# Patient Record
Sex: FEMALE | Race: WHITE | NOT HISPANIC OR LATINO | URBAN - METROPOLITAN AREA
[De-identification: names, ages, dates, MRNs, and addresses within clinical notes are randomized per-mention and may not be internally consistent; named-entity substitution may affect disease eponyms.]

---

## 2017-06-13 ENCOUNTER — APPOINTMENT (RX ONLY)
Dept: URBAN - METROPOLITAN AREA CLINIC 24 | Facility: CLINIC | Age: 42
Setting detail: DERMATOLOGY
End: 2017-06-13

## 2017-06-13 DIAGNOSIS — D485 NEOPLASM OF UNCERTAIN BEHAVIOR OF SKIN: ICD-10-CM

## 2017-06-13 DIAGNOSIS — L57.8 OTHER SKIN CHANGES DUE TO CHRONIC EXPOSURE TO NONIONIZING RADIATION: ICD-10-CM

## 2017-06-13 PROBLEM — L20.84 INTRINSIC (ALLERGIC) ECZEMA: Status: ACTIVE | Noted: 2017-06-13

## 2017-06-13 PROBLEM — H91.90 UNSPECIFIED HEARING LOSS, UNSPECIFIED EAR: Status: ACTIVE | Noted: 2017-06-13

## 2017-06-13 PROBLEM — L70.0 ACNE VULGARIS: Status: ACTIVE | Noted: 2017-06-13

## 2017-06-13 PROBLEM — D23.62 OTHER BENIGN NEOPLASM OF SKIN OF LEFT UPPER LIMB, INCLUDING SHOULDER: Status: ACTIVE | Noted: 2017-06-13

## 2017-06-13 PROBLEM — D48.5 NEOPLASM OF UNCERTAIN BEHAVIOR OF SKIN: Status: ACTIVE | Noted: 2017-06-13

## 2017-06-13 PROBLEM — J30.1 ALLERGIC RHINITIS DUE TO POLLEN: Status: ACTIVE | Noted: 2017-06-13

## 2017-06-13 PROCEDURE — ? COUNSELING

## 2017-06-13 PROCEDURE — ? BIOPSY BY SHAVE METHOD

## 2017-06-13 PROCEDURE — 99202 OFFICE O/P NEW SF 15 MIN: CPT | Mod: 25

## 2017-06-13 PROCEDURE — 11100: CPT

## 2017-06-13 PROCEDURE — 11101: CPT

## 2017-06-13 ASSESSMENT — LOCATION DETAILED DESCRIPTION DERM
LOCATION DETAILED: RIGHT SUPERIOR LATERAL UPPER BACK
LOCATION DETAILED: RIGHT MEDIAL SUPERIOR CHEST
LOCATION DETAILED: LEFT INFERIOR CENTRAL MALAR CHEEK
LOCATION DETAILED: RIGHT SUPERIOR UPPER BACK
LOCATION DETAILED: RIGHT CENTRAL MALAR CHEEK

## 2017-06-13 ASSESSMENT — LOCATION SIMPLE DESCRIPTION DERM
LOCATION SIMPLE: RIGHT UPPER BACK
LOCATION SIMPLE: RIGHT CHEEK
LOCATION SIMPLE: RIGHT BACK
LOCATION SIMPLE: CHEST
LOCATION SIMPLE: LEFT CHEEK

## 2017-06-13 ASSESSMENT — LOCATION ZONE DERM
LOCATION ZONE: TRUNK
LOCATION ZONE: FACE

## 2017-06-13 NOTE — PROCEDURE: BIOPSY BY SHAVE METHOD
Biopsy Type: H and E
Bill 31213 For Specimen Handling/Conveyance To Laboratory?: no
Type Of Destruction Used: Curettage
Path Notes (To The Dermatopathologist): Zyclara if superficial
Consent: Written consent was obtained and risks were reviewed including but not limited to scarring, infection, bleeding, scabbing, incomplete removal, and allergy to anesthesia.
Dressing: bandage
Hemostasis: Aluminum Chloride
Additional Anesthesia Volume In Cc (Will Not Render If 0): 0
Biopsy Method: Dermablade
Post-care instructions were reviewed in detail and written instructions are provided. Patient is to keep the biopsy site dry overnight, and then apply bacitracin twice daily until healed. Patient may apply hydrogen peroxide soaks to remove any crusting.
Anesthesia Type: 1% lidocaine with epinephrine
Wound Care: Vaseline
Cryotherapy Text: The wound bed was treated with cryotherapy after the biopsy was performed.
Anesthesia Volume In Cc: 0.3
Electrodesiccation And Curettage Text: The wound bed was treated with electrodesiccation and curettage after the biopsy was performed.
Silver Nitrate Text: The wound bed was treated with silver nitrate after the biopsy was performed.
Accession #: [id number]
Electrodesiccation Text: The wound bed was treated with electrodesiccation after the biopsy was performed.
Detail Level: Detailed
Notification Instructions: Patient will be notified of biopsy results. However, patient instructed to call the office if not contacted within 2 weeks.
Billing Type: Third-Party Bill

## 2021-05-17 NOTE — H&P
Gynecology Major Surgery History and Physical    Jaclynn Canavan  [id number]    Subjective:      Chief complaint:  abnormal uterine bleeding    Laurence Riggins is a 42 y.o.  female with a history of abnormal uterine bleeding. Previous treatment measures include hormone therapy. Ultrasound findings include fibroids and findings consistent with adenomyosis. She is admitted for Procedure(s) (LRB):  HYSTERECTOMY TOTAL LAPAROSCOPIC WITH BILATERAL SALPINGECTOMY (N/A)  ALTIS MID URETHRAL SLING (N/A)    The current method of family planning is vasectomy. Past Medical History:   Diagnosis Date    Cancer ([address]     basal cell    Headache      Past Surgical History:   Procedure Laterality Date    HX HEENT      Sinus     OB History        2    Para   2    Term                AB        Living           SAB        TAB        Ectopic        Molar        Multiple        Live Births                    No Known Allergies    Cannot display prior to admission medications because the patient has not been admitted in this contact.        Family History   Problem Relation Age of Onset    Heart Disease Father     Cancer Maternal Aunt         Breast     Social History     Socioeconomic History    Marital status:      Spouse name: Not on file    Number of children: Not on file    Years of education: Not on file    Highest education level: Not on file   Occupational History    Not on file   Social Needs    Financial resource strain: Not on file    Food insecurity     Worry: Not on file     Inability: Not on file    Transportation needs     Medical: Not on file     Non-medical: Not on file   Tobacco Use    Smoking status: Never Smoker    Smokeless tobacco: Never Used   Substance and Sexual Activity    Alcohol use: No     Frequency: Never    Drug use: No    Sexual activity: Yes     Partners: Male     Birth control/protection: None   Lifestyle    Physical activity     Days per week: Not on file Minutes per session: Not on file    Stress: Not on file   Relationships    Social connections     Talks on phone: Not on file     Gets together: Not on file     Attends [employer] service: Not on file     Active member of club or organization: Not on file     Attends meetings of clubs or organizations: Not on file     Relationship status: Not on file    Intimate partner violence     Fear of current or ex partner: Not on file     Emotionally abused: Not on file     Physically abused: Not on file     Forced sexual activity: Not on file   Other Topics Concern    Not on file   Social History Narrative    Not on file         Review of Systems:  Pertinent items are noted in HPI. Objective: There were no vitals filed for this visit. Physical Exam:  General:  alert, cooperative, no distress, appears stated age   Lungs:  clear to auscultation bilaterally   Heart:  regular rate and rhythm, S1, S2 normal, no murmur, click, rub or gallop   Abdomen: soft, non-tender. Bowel sounds normal. No masses,  no organomegaly. Assessment:     abnormal uterine bleeding, fibroids with fibroids       Plan:     1. Procedure(s) (LRB):  HYSTERECTOMY TOTAL LAPAROSCOPIC WITH BILATERAL SALPINGECTOMY (N/A)  ALTIS MID URETHRAL SLING (N/A)  Discussed the risk of surgery including the risks of bleeding, infection, DVT, and surgical injuries to internal organs including but not limited to the bowels, bladder, rectum, and female reproductive organs. The patient understands the risks, any and all questions were answered to the patient's satisfaction.

## 2021-05-24 VITALS — BODY MASS INDEX: 27.47 KG/M2 | WEIGHT: 175 LBS | HEIGHT: 67 IN

## 2021-05-24 NOTE — PERIOP NOTES
Patient verified name and . Order for consent is found in EHR and matches case posting; patient verifies procedure. Type 2 surgery, Phone assessment complete. Orders were received. Labs per surgeon: none  Labs per anesthesia protocol: hgb - pt to go to out pt lab prior to surgery    Patient COVID test date 21  8645. The testing center is located at the . [address] [facility]. If appointment is needed-patient provided telephone number of [phone]. Patient answered medical/surgical history questions at their best of ability. All prior to admission medications documented in [facility]. Patient instructed to take the following medications the day of surgery according to anesthesia guidelines with a small sip of water:none. Hold all vitamins 7 days prior to surgery and NSAIDS 5 days prior to surgery. Prescription meds to hold:none    Patient instructed on the following:    > Arrive at [address] [facility], time of arrival to be called the day before by 1700  > NPO after midnight including gum, mints, and ice chips  > Responsible adult must drive patient to the hospital, stay during surgery, and patient will need supervision 24 hours after anesthesia  > Use hibiclens in shower the night before surgery and on the morning of surgery  > All piercings must be removed prior to arrival.    > Leave all valuables (money and jewelry) at home but bring insurance card and ID on DOS.   > Do not wear make-up, nail polish, lotions, cologne, perfumes, powders, or oil on skin.

## 2021-05-25 ENCOUNTER — HOSPITAL ENCOUNTER (OUTPATIENT)
Dept: LAB | Age: 46
Discharge: HOME | End: 2021-05-25
Attending: OBSTETRICS & GYNECOLOGY
Payer: COMMERCIAL

## 2021-05-25 ENCOUNTER — HOSPITAL ENCOUNTER (OUTPATIENT)
Dept: SURGERY | Age: 46
Discharge: HOME | End: 2021-05-25

## 2021-05-25 LAB — HGB BLD-MCNC: 9.7 G/DL (ref 11.7–15.4)

## 2021-05-25 PROCEDURE — 36415 COLL VENOUS BLD VENIPUNCTURE: CPT

## 2021-05-25 PROCEDURE — 85018 HEMOGLOBIN: CPT

## 2021-05-26 NOTE — PERIOP NOTES
The below lab results routed via [facility] to surgeon per anesthesia protocol. Results within anesthesia limits.      Recent Results (from the past 24 hour(s))   HEMOGLOBIN    Collection Time: 05/25/21  2:55 PM   Result Value Ref Range    HGB 9.7 (L) 11.7 - 15.4 g/dL

## 2021-05-31 ENCOUNTER — ANESTHESIA EVENT (OUTPATIENT)
Dept: SURGERY | Age: 46
End: 2021-05-31
Payer: COMMERCIAL

## 2021-06-01 ENCOUNTER — ANESTHESIA (OUTPATIENT)
Dept: SURGERY | Age: 46
End: 2021-06-01
Payer: COMMERCIAL

## 2021-06-01 ENCOUNTER — HOSPITAL ENCOUNTER (OUTPATIENT)
Age: 46
Discharge: HOME | End: 2021-06-01
Attending: OBSTETRICS & GYNECOLOGY | Admitting: OBSTETRICS & GYNECOLOGY
Payer: COMMERCIAL

## 2021-06-01 VITALS
SYSTOLIC BLOOD PRESSURE: 115 MMHG | HEART RATE: 86 BPM | OXYGEN SATURATION: 99 % | BODY MASS INDEX: 27.82 KG/M2 | WEIGHT: 175 LBS | RESPIRATION RATE: 15 BRPM | TEMPERATURE: 98.8 F | DIASTOLIC BLOOD PRESSURE: 71 MMHG

## 2021-06-01 DIAGNOSIS — N39.3 SUI (STRESS URINARY INCONTINENCE, FEMALE): ICD-10-CM

## 2021-06-01 DIAGNOSIS — D25.1 FIBROIDS, INTRAMURAL: Primary | ICD-10-CM

## 2021-06-01 LAB
ABO + RH BLD: NORMAL
APPEARANCE UR: NORMAL
BILIRUB UR QL: NEGATIVE
BLOOD GROUP ANTIBODIES SERPL: NORMAL
COLOR UR: YELLOW
GLUCOSE UR STRIP.AUTO-MCNC: NEGATIVE MG/DL
HCG UR QL: NEGATIVE
HGB UR QL STRIP: NEGATIVE
KETONES UR QL STRIP.AUTO: NEGATIVE MG/DL
LEUKOCYTE ESTERASE UR QL STRIP.AUTO: NEGATIVE
NITRITE UR QL STRIP.AUTO: NEGATIVE
PH UR STRIP: 6 [PH] (ref 5–9)
PROT UR STRIP-MCNC: NEGATIVE MG/DL
SP GR UR REFRACTOMETRY: 1.01 (ref 1–1.02)
SPECIMEN EXP DATE BLD: NORMAL
UROBILINOGEN UR QL STRIP.AUTO: 0.2 EU/DL (ref 0.2–1)

## 2021-06-01 PROCEDURE — 77030012770 HC TRCR OPT FX AMR -B: Performed by: OBSTETRICS & GYNECOLOGY

## 2021-06-01 PROCEDURE — 77030031492 HC PRT ACC BLNT AIRSEAL CNMD -B: Performed by: OBSTETRICS & GYNECOLOGY

## 2021-06-01 PROCEDURE — 81003 URINALYSIS AUTO W/O SCOPE: CPT

## 2021-06-01 PROCEDURE — 77030019927 HC TBNG IRR CYSTO BAXT -A: Performed by: OBSTETRICS & GYNECOLOGY

## 2021-06-01 PROCEDURE — 77030041236 HC APPL SURG ENDO JNJ -B: Performed by: OBSTETRICS & GYNECOLOGY

## 2021-06-01 PROCEDURE — 77030038160 HC SHR COAG HARM J&J -F: Performed by: OBSTETRICS & GYNECOLOGY

## 2021-06-01 PROCEDURE — 77030039425 HC BLD LARYNG TRULITE DISP TELE -A: Performed by: ANESTHESIOLOGY

## 2021-06-01 PROCEDURE — 74011250637 HC RX REV CODE- 250/637: Performed by: UROLOGY

## 2021-06-01 PROCEDURE — 77030031139 HC SUT VCRL2 J&J -A: Performed by: OBSTETRICS & GYNECOLOGY

## 2021-06-01 PROCEDURE — 77030019908 HC STETH ESOPH SIMS -A: Performed by: ANESTHESIOLOGY

## 2021-06-01 PROCEDURE — 77030010507 HC ADH SKN DERMBND J&J -B: Performed by: OBSTETRICS & GYNECOLOGY

## 2021-06-01 PROCEDURE — 77030035236 HC SUT PDS STRATFX BARB J&J -B: Performed by: OBSTETRICS & GYNECOLOGY

## 2021-06-01 PROCEDURE — 77030034849: Performed by: OBSTETRICS & GYNECOLOGY

## 2021-06-01 PROCEDURE — 77030040361 HC SLV COMPR DVT MDII -B: Performed by: OBSTETRICS & GYNECOLOGY

## 2021-06-01 PROCEDURE — 77030040922 HC BLNKT HYPOTHRM STRY -A: Performed by: ANESTHESIOLOGY

## 2021-06-01 PROCEDURE — 77030037088 HC TUBE ENDOTRACH ORAL NSL COVD-A: Performed by: ANESTHESIOLOGY

## 2021-06-01 PROCEDURE — 77030037285 HC MANIP UTER DELINTR ADVINCULA DISP COOP -C: Performed by: OBSTETRICS & GYNECOLOGY

## 2021-06-01 PROCEDURE — 77030040830 HC CATH URETH FOL MDII -A: Performed by: OBSTETRICS & GYNECOLOGY

## 2021-06-01 PROCEDURE — 74011250637 HC RX REV CODE- 250/637: Performed by: ANESTHESIOLOGY

## 2021-06-01 PROCEDURE — 77030008522 HC TBNG INSUF LAPRO STRY -B: Performed by: OBSTETRICS & GYNECOLOGY

## 2021-06-01 PROCEDURE — 86901 BLOOD TYPING SEROLOGIC RH(D): CPT

## 2021-06-01 PROCEDURE — 74011250636 HC RX REV CODE- 250/636: Performed by: ANESTHESIOLOGY

## 2021-06-01 PROCEDURE — 58571 TLH W/T/O 250 G OR LESS: CPT | Performed by: OBSTETRICS & GYNECOLOGY

## 2021-06-01 PROCEDURE — 57288 REPAIR BLADDER DEFECT: CPT | Performed by: UROLOGY

## 2021-06-01 PROCEDURE — 77030008606 HC TRCR ENDOSC KII AMR -B: Performed by: OBSTETRICS & GYNECOLOGY

## 2021-06-01 PROCEDURE — 74011250636 HC RX REV CODE- 250/636: Performed by: NURSE ANESTHETIST, CERTIFIED REGISTERED

## 2021-06-01 PROCEDURE — 76060000036 HC ANESTHESIA 2.5 TO 3 HR: Performed by: OBSTETRICS & GYNECOLOGY

## 2021-06-01 PROCEDURE — 76210000016 HC OR PH I REC 1 TO 1.5 HR: Performed by: OBSTETRICS & GYNECOLOGY

## 2021-06-01 PROCEDURE — 77030010513 HC APPL CLP LIG J&J -C: Performed by: OBSTETRICS & GYNECOLOGY

## 2021-06-01 PROCEDURE — 2709999900 HC NON-CHARGEABLE SUPPLY: Performed by: OBSTETRICS & GYNECOLOGY

## 2021-06-01 PROCEDURE — 74011250636 HC RX REV CODE- 250/636: Performed by: UROLOGY

## 2021-06-01 PROCEDURE — 77030008756 HC TU IRR SUC STRY -B: Performed by: OBSTETRICS & GYNECOLOGY

## 2021-06-01 PROCEDURE — 99218 HC RM OBSERVATION: CPT

## 2021-06-01 PROCEDURE — 74011000250 HC RX REV CODE- 250: Performed by: NURSE ANESTHETIST, CERTIFIED REGISTERED

## 2021-06-01 PROCEDURE — 77030039147 HC PWDR HEMSTS SURGICEL JNJ -D: Performed by: OBSTETRICS & GYNECOLOGY

## 2021-06-01 PROCEDURE — 76010000172 HC OR TIME 2.5 TO 3 HR INTENSV-TIER 1: Performed by: OBSTETRICS & GYNECOLOGY

## 2021-06-01 PROCEDURE — C1771 REP DEV, URINARY, W/SLING: HCPCS | Performed by: OBSTETRICS & GYNECOLOGY

## 2021-06-01 PROCEDURE — 74011000250 HC RX REV CODE- 250: Performed by: UROLOGY

## 2021-06-01 PROCEDURE — 77030033188 HC TBNG FLTRD BIIFUR DISP CNMD -C: Performed by: OBSTETRICS & GYNECOLOGY

## 2021-06-01 PROCEDURE — 74011000250 HC RX REV CODE- 250: Performed by: OBSTETRICS & GYNECOLOGY

## 2021-06-01 PROCEDURE — 81025 URINE PREGNANCY TEST: CPT

## 2021-06-01 PROCEDURE — 77030002933 HC SUT MCRYL J&J -A: Performed by: OBSTETRICS & GYNECOLOGY

## 2021-06-01 PROCEDURE — 77030018352 HC SHR COAG HARM2 J&J -E: Performed by: OBSTETRICS & GYNECOLOGY

## 2021-06-01 PROCEDURE — 74011250637 HC RX REV CODE- 250/637: Performed by: OBSTETRICS & GYNECOLOGY

## 2021-06-01 PROCEDURE — 88307 TISSUE EXAM BY PATHOLOGIST: CPT

## 2021-06-01 DEVICE — SINGLE INCISION SLING SYSTEM
Type: IMPLANTABLE DEVICE | Site: URETHRA | Status: FUNCTIONAL
Brand: ALTIS

## 2021-06-01 RX ORDER — SODIUM CHLORIDE, SODIUM LACTATE, POTASSIUM CHLORIDE, CALCIUM CHLORIDE 600; 310; 30; 20 MG/100ML; MG/100ML; MG/100ML; MG/100ML
75 INJECTION, SOLUTION INTRAVENOUS CONTINUOUS
Status: DISCONTINUED | OUTPATIENT
Start: 2021-06-01 | End: 2021-06-01 | Stop reason: HOSPADM

## 2021-06-01 RX ORDER — KETAMINE HYDROCHLORIDE 50 MG/ML
INJECTION, SOLUTION INTRAMUSCULAR; INTRAVENOUS AS NEEDED
Status: DISCONTINUED | OUTPATIENT
Start: 2021-06-01 | End: 2021-06-01 | Stop reason: HOSPADM

## 2021-06-01 RX ORDER — KETOROLAC TROMETHAMINE 10 MG/1
10 TABLET, FILM COATED ORAL ONCE
Status: COMPLETED | OUTPATIENT
Start: 2021-06-01 | End: 2021-06-01

## 2021-06-01 RX ORDER — NALOXONE HYDROCHLORIDE 0.4 MG/ML
0.1 INJECTION, SOLUTION INTRAMUSCULAR; INTRAVENOUS; SUBCUTANEOUS
Status: DISCONTINUED | OUTPATIENT
Start: 2021-06-01 | End: 2021-06-01 | Stop reason: HOSPADM

## 2021-06-01 RX ORDER — ONDANSETRON 2 MG/ML
INJECTION INTRAMUSCULAR; INTRAVENOUS AS NEEDED
Status: DISCONTINUED | OUTPATIENT
Start: 2021-06-01 | End: 2021-06-01 | Stop reason: HOSPADM

## 2021-06-01 RX ORDER — KETOROLAC TROMETHAMINE 30 MG/ML
INJECTION, SOLUTION INTRAMUSCULAR; INTRAVENOUS AS NEEDED
Status: DISCONTINUED | OUTPATIENT
Start: 2021-06-01 | End: 2021-06-01 | Stop reason: HOSPADM

## 2021-06-01 RX ORDER — CEFAZOLIN SODIUM/WATER 2 G/20 ML
2 SYRINGE (ML) INTRAVENOUS
Status: COMPLETED | OUTPATIENT
Start: 2021-06-01 | End: 2021-06-01

## 2021-06-01 RX ORDER — KETOROLAC TROMETHAMINE 30 MG/ML
30 INJECTION, SOLUTION INTRAMUSCULAR; INTRAVENOUS EVERY 6 HOURS
Status: DISCONTINUED | OUTPATIENT
Start: 2021-06-01 | End: 2021-06-01

## 2021-06-01 RX ORDER — ROCURONIUM BROMIDE 10 MG/ML
INJECTION, SOLUTION INTRAVENOUS AS NEEDED
Status: DISCONTINUED | OUTPATIENT
Start: 2021-06-01 | End: 2021-06-01 | Stop reason: HOSPADM

## 2021-06-01 RX ORDER — HYDROMORPHONE HYDROCHLORIDE 1 MG/ML
1 INJECTION, SOLUTION INTRAMUSCULAR; INTRAVENOUS; SUBCUTANEOUS
Status: DISCONTINUED | OUTPATIENT
Start: 2021-06-01 | End: 2021-06-01 | Stop reason: HOSPADM

## 2021-06-01 RX ORDER — DIPHENHYDRAMINE HYDROCHLORIDE 50 MG/ML
12.5 INJECTION, SOLUTION INTRAMUSCULAR; INTRAVENOUS
Status: DISCONTINUED | OUTPATIENT
Start: 2021-06-01 | End: 2021-06-01 | Stop reason: HOSPADM

## 2021-06-01 RX ORDER — FLUMAZENIL 0.1 MG/ML
0.2 INJECTION INTRAVENOUS AS NEEDED
Status: DISCONTINUED | OUTPATIENT
Start: 2021-06-01 | End: 2021-06-01 | Stop reason: HOSPADM

## 2021-06-01 RX ORDER — LIDOCAINE HYDROCHLORIDE 10 MG/ML
0.1 INJECTION INFILTRATION; PERINEURAL AS NEEDED
Status: DISCONTINUED | OUTPATIENT
Start: 2021-06-01 | End: 2021-06-01 | Stop reason: HOSPADM

## 2021-06-01 RX ORDER — MIDAZOLAM HYDROCHLORIDE 1 MG/ML
2 INJECTION, SOLUTION INTRAMUSCULAR; INTRAVENOUS
Status: DISCONTINUED | OUTPATIENT
Start: 2021-06-01 | End: 2021-06-01 | Stop reason: HOSPADM

## 2021-06-01 RX ORDER — NEOSTIGMINE METHYLSULFATE 1 MG/ML
INJECTION, SOLUTION INTRAVENOUS AS NEEDED
Status: DISCONTINUED | OUTPATIENT
Start: 2021-06-01 | End: 2021-06-01 | Stop reason: HOSPADM

## 2021-06-01 RX ORDER — PROPOFOL 10 MG/ML
INJECTION, EMULSION INTRAVENOUS AS NEEDED
Status: DISCONTINUED | OUTPATIENT
Start: 2021-06-01 | End: 2021-06-01 | Stop reason: HOSPADM

## 2021-06-01 RX ORDER — OXYCODONE HYDROCHLORIDE 5 MG/1
10 TABLET ORAL
Status: COMPLETED | OUTPATIENT
Start: 2021-06-01 | End: 2021-06-01

## 2021-06-01 RX ORDER — DEXAMETHASONE SODIUM PHOSPHATE 4 MG/ML
INJECTION, SOLUTION INTRA-ARTICULAR; INTRALESIONAL; INTRAMUSCULAR; INTRAVENOUS; SOFT TISSUE AS NEEDED
Status: DISCONTINUED | OUTPATIENT
Start: 2021-06-01 | End: 2021-06-01 | Stop reason: HOSPADM

## 2021-06-01 RX ORDER — FENTANYL CITRATE 50 UG/ML
INJECTION, SOLUTION INTRAMUSCULAR; INTRAVENOUS AS NEEDED
Status: DISCONTINUED | OUTPATIENT
Start: 2021-06-01 | End: 2021-06-01 | Stop reason: HOSPADM

## 2021-06-01 RX ORDER — ZOLPIDEM TARTRATE 5 MG/1
5 TABLET ORAL
Status: DISCONTINUED | OUTPATIENT
Start: 2021-06-01 | End: 2021-06-01 | Stop reason: HOSPADM

## 2021-06-01 RX ORDER — GLYCOPYRROLATE 0.2 MG/ML
INJECTION INTRAMUSCULAR; INTRAVENOUS AS NEEDED
Status: DISCONTINUED | OUTPATIENT
Start: 2021-06-01 | End: 2021-06-01 | Stop reason: HOSPADM

## 2021-06-01 RX ORDER — ACETAMINOPHEN 500 MG
1000 TABLET ORAL ONCE
Status: COMPLETED | OUTPATIENT
Start: 2021-06-01 | End: 2021-06-01

## 2021-06-01 RX ORDER — HYDROMORPHONE HYDROCHLORIDE 2 MG/ML
INJECTION, SOLUTION INTRAMUSCULAR; INTRAVENOUS; SUBCUTANEOUS AS NEEDED
Status: DISCONTINUED | OUTPATIENT
Start: 2021-06-01 | End: 2021-06-01 | Stop reason: HOSPADM

## 2021-06-01 RX ORDER — SODIUM CHLORIDE 0.9 % (FLUSH) 0.9 %
5-40 SYRINGE (ML) INJECTION AS NEEDED
Status: DISCONTINUED | OUTPATIENT
Start: 2021-06-01 | End: 2021-06-01 | Stop reason: HOSPADM

## 2021-06-01 RX ORDER — HYDROMORPHONE HYDROCHLORIDE 2 MG/ML
0.5 INJECTION, SOLUTION INTRAMUSCULAR; INTRAVENOUS; SUBCUTANEOUS
Status: DISCONTINUED | OUTPATIENT
Start: 2021-06-01 | End: 2021-06-01 | Stop reason: HOSPADM

## 2021-06-01 RX ORDER — LIDOCAINE HYDROCHLORIDE 20 MG/ML
INJECTION, SOLUTION EPIDURAL; INFILTRATION; INTRACAUDAL; PERINEURAL AS NEEDED
Status: DISCONTINUED | OUTPATIENT
Start: 2021-06-01 | End: 2021-06-01 | Stop reason: HOSPADM

## 2021-06-01 RX ORDER — OXYCODONE AND ACETAMINOPHEN 5; 325 MG/1; MG/1
1 TABLET ORAL
Qty: 20 TABLET | Refills: 0 | Status: SHIPPED | OUTPATIENT
Start: 2021-06-01 | End: 2021-06-06

## 2021-06-01 RX ORDER — NALOXONE HYDROCHLORIDE 0.4 MG/ML
0.4 INJECTION, SOLUTION INTRAMUSCULAR; INTRAVENOUS; SUBCUTANEOUS AS NEEDED
Status: DISCONTINUED | OUTPATIENT
Start: 2021-06-01 | End: 2021-06-01 | Stop reason: HOSPADM

## 2021-06-01 RX ORDER — ACETAMINOPHEN 325 MG/1
650 TABLET ORAL
Status: DISCONTINUED | OUTPATIENT
Start: 2021-06-01 | End: 2021-06-01 | Stop reason: HOSPADM

## 2021-06-01 RX ORDER — BUPIVACAINE HYDROCHLORIDE AND EPINEPHRINE 5; 5 MG/ML; UG/ML
INJECTION, SOLUTION EPIDURAL; INTRACAUDAL; PERINEURAL AS NEEDED
Status: DISCONTINUED | OUTPATIENT
Start: 2021-06-01 | End: 2021-06-01 | Stop reason: HOSPADM

## 2021-06-01 RX ORDER — OXYCODONE AND ACETAMINOPHEN 5; 325 MG/1; MG/1
1 TABLET ORAL
Status: DISCONTINUED | OUTPATIENT
Start: 2021-06-01 | End: 2021-06-01 | Stop reason: HOSPADM

## 2021-06-01 RX ORDER — OXYCODONE HYDROCHLORIDE 5 MG/1
5 TABLET ORAL
Status: DISCONTINUED | OUTPATIENT
Start: 2021-06-01 | End: 2021-06-01 | Stop reason: HOSPADM

## 2021-06-01 RX ORDER — CEFAZOLIN SODIUM/WATER 2 G/20 ML
2 SYRINGE (ML) INTRAVENOUS ONCE
Status: DISCONTINUED | OUTPATIENT
Start: 2021-06-01 | End: 2021-06-01 | Stop reason: SDUPTHER

## 2021-06-01 RX ORDER — SODIUM CHLORIDE 0.9 % (FLUSH) 0.9 %
5-40 SYRINGE (ML) INJECTION EVERY 8 HOURS
Status: DISCONTINUED | OUTPATIENT
Start: 2021-06-01 | End: 2021-06-01 | Stop reason: HOSPADM

## 2021-06-01 RX ORDER — SCOLOPAMINE TRANSDERMAL SYSTEM 1 MG/1
1 PATCH, EXTENDED RELEASE TRANSDERMAL ONCE
Status: DISCONTINUED | OUTPATIENT
Start: 2021-06-01 | End: 2021-06-01 | Stop reason: HOSPADM

## 2021-06-01 RX ADMIN — ONDANSETRON 4 MG: 2 INJECTION INTRAMUSCULAR; INTRAVENOUS at 12:10

## 2021-06-01 RX ADMIN — PHENYLEPHRINE HYDROCHLORIDE 50 MCG: 10 INJECTION INTRAVENOUS at 10:33

## 2021-06-01 RX ADMIN — FENTANYL CITRATE 50 MCG: 50 INJECTION INTRAMUSCULAR; INTRAVENOUS at 11:10

## 2021-06-01 RX ADMIN — LIDOCAINE HYDROCHLORIDE 80 MG: 20 INJECTION, SOLUTION EPIDURAL; INFILTRATION; INTRACAUDAL; PERINEURAL at 10:13

## 2021-06-01 RX ADMIN — ACETAMINOPHEN 1000 MG: 500 TABLET, FILM COATED ORAL at 07:41

## 2021-06-01 RX ADMIN — CEFAZOLIN 2 G: 1 INJECTION, POWDER, FOR SOLUTION INTRAVENOUS at 10:18

## 2021-06-01 RX ADMIN — GLYCOPYRROLATE 0.4 MG: 0.2 INJECTION, SOLUTION INTRAMUSCULAR; INTRAVENOUS at 12:39

## 2021-06-01 RX ADMIN — KETOROLAC TROMETHAMINE 10 MG: 10 TABLET, FILM COATED ORAL at 18:34

## 2021-06-01 RX ADMIN — OXYCODONE HYDROCHLORIDE AND ACETAMINOPHEN 1 TABLET: 5; 325 TABLET ORAL at 20:14

## 2021-06-01 RX ADMIN — ROCURONIUM BROMIDE 20 MG: 10 INJECTION, SOLUTION INTRAVENOUS at 10:51

## 2021-06-01 RX ADMIN — KETAMINE HYDROCHLORIDE 40 MG: 50 INJECTION INTRAMUSCULAR; INTRAVENOUS at 10:21

## 2021-06-01 RX ADMIN — PROPOFOL 50 MCG/KG/MIN: 10 INJECTION, EMULSION INTRAVENOUS at 10:42

## 2021-06-01 RX ADMIN — HYDROMORPHONE HYDROCHLORIDE 0.5 MG: 2 INJECTION, SOLUTION INTRAMUSCULAR; INTRAVENOUS; SUBCUTANEOUS at 13:59

## 2021-06-01 RX ADMIN — PROPOFOL 200 MG: 10 INJECTION, EMULSION INTRAVENOUS at 10:13

## 2021-06-01 RX ADMIN — FENTANYL CITRATE 100 MCG: 50 INJECTION INTRAMUSCULAR; INTRAVENOUS at 10:13

## 2021-06-01 RX ADMIN — ROCURONIUM BROMIDE 40 MG: 10 INJECTION, SOLUTION INTRAVENOUS at 10:13

## 2021-06-01 RX ADMIN — FENTANYL CITRATE 50 MCG: 50 INJECTION INTRAMUSCULAR; INTRAVENOUS at 11:04

## 2021-06-01 RX ADMIN — KETOROLAC TROMETHAMINE 30 MG: 30 INJECTION, SOLUTION INTRAMUSCULAR; INTRAVENOUS at 12:10

## 2021-06-01 RX ADMIN — Medication 3 MG: at 12:39

## 2021-06-01 RX ADMIN — SODIUM CHLORIDE, SODIUM LACTATE, POTASSIUM CHLORIDE, AND CALCIUM CHLORIDE: 600; 310; 30; 20 INJECTION, SOLUTION INTRAVENOUS at 11:50

## 2021-06-01 RX ADMIN — OXYCODONE 10 MG: 5 TABLET ORAL at 14:07

## 2021-06-01 RX ADMIN — HYDROMORPHONE HYDROCHLORIDE 0.5 MG: 2 INJECTION INTRAMUSCULAR; INTRAVENOUS; SUBCUTANEOUS at 11:21

## 2021-06-01 RX ADMIN — SODIUM CHLORIDE, SODIUM LACTATE, POTASSIUM CHLORIDE, AND CALCIUM CHLORIDE: 600; 310; 30; 20 INJECTION, SOLUTION INTRAVENOUS at 10:30

## 2021-06-01 RX ADMIN — ACETAMINOPHEN 650 MG: 325 TABLET ORAL at 17:18

## 2021-06-01 RX ADMIN — DEXAMETHASONE SODIUM PHOSPHATE 8 MG: 4 INJECTION, SOLUTION INTRAMUSCULAR; INTRAVENOUS at 10:22

## 2021-06-01 RX ADMIN — SODIUM CHLORIDE, SODIUM LACTATE, POTASSIUM CHLORIDE, AND CALCIUM CHLORIDE 75 ML/HR: 600; 310; 30; 20 INJECTION, SOLUTION INTRAVENOUS at 08:00

## 2021-06-01 RX ADMIN — PHENYLEPHRINE HYDROCHLORIDE 50 MCG: 10 INJECTION INTRAVENOUS at 10:48

## 2021-06-01 NOTE — BRIEF OP NOTE
Brief Postoperative Note    Patient: Noemi Arroyo  YOB: 1975  MRN: [id number]    Date of Procedure: 6/1/2021     Pre-Op Diagnosis: Menorrhagia with regular cycle [N92.0]  Fibroid [D21.9]  Adenomyosis [N80.0]  Female stress incontinence [N39.3]    Post-Op Diagnosis: Same as preoperative diagnosis. Procedure(s): HYSTERECTOMY TOTAL LAPAROSCOPIC WITH BILATERAL SALPINGECTOMY  ALTIS MID URETHRAL SLING; CYSTOSCOPY    Surgeon(s): Cordella Galla, MD Lester Gut, DO Robin Aase, MD    Surgical Assistant: None    Anesthesia: General     Estimated Blood Loss (mL): Minimal    Complications: None    Specimens:   ID Type Source Tests Collected by Time Destination   1 : Uterus and bilateral fallopian tubes Fresh Pelvis  Daquan Lancaster DO 6/1/2021 1127 Pathology        Implants:   Implant Name Type Inv. Item Serial No.  Lot No. LRB No. Used Action   SYSTEM SLNG SGL INCIS W/ INTRO ANCHR TENSIONING SUT ALTIS - [id number]  SYSTEM SLNG SGL INCIS W/ INTRO ANCHR TENSIONING SUT ALTIS  [employer] [id number] N/A 1 Implanted       Drains: 16 Fr davalos catheter with 10 cc sterile water in balloon. Findings: Unremarkable Altis Mid-urethral sling placement. Normal cystoscopy with efflux of clear urine from bilateral ureters.      Electronically Signed by Laith Baum MD on 6/1/2021 at 1:23 PM

## 2021-06-01 NOTE — PROGRESS NOTES
Gynecology Progress Note    Patient doing well post-op day 0 from Procedure(s): HYSTERECTOMY TOTAL LAPAROSCOPIC WITH BILATERAL SALPINGECTOMY  ALTIS MID URETHRAL SLING; CYSTOSCOPY without significant complaints. Pain controlled on current medication. Vitals:  Blood pressure 118/73, pulse 100, temperature 98.3 °F (36.8 °C), resp. rate 18, weight 175 lb (79.4 kg), last menstrual period 2021, SpO2 100 %. Temp (24hrs), Av.1 °F (36.7 °C), Min:97.3 °F (36.3 °C), Max:98.4 °F (36.9 °C)        Exam:  Patient without distress. Abdomen soft,  nontender. Incision dry and clean without erythema. Lower extremities are negative for swelling, cords, or tenderness. Lab/Data Review: All lab results for the last 24 hours reviewed. Assessment and Plan:  Patient appears to be having uncomplicated post Procedure(s): HYSTERECTOMY TOTAL LAPAROSCOPIC WITH BILATERAL SALPINGECTOMY  ALTIS MID URETHRAL SLING; CYSTOSCOPY course. Continue routine post-op care.    Wants to discharge now- Анна gave her instructions for removal in am and to call his office with retention  Remove vag pack prior to discharge

## 2021-06-01 NOTE — PROGRESS NOTES
Pt arrived on unit. Admission assessment completed. Oriented to room and POC. Verbalizes understanding.

## 2021-06-01 NOTE — DISCHARGE INSTRUCTIONS
Patient Education        Laparoscopically Assisted Vaginal Hysterectomy: What to Expect at Home  Your Recovery   Patient Education        Learning About Removing a Henderson Catheter at Home  Overview  An indwelling catheter helps drain your bladder. The most common type is a Henderson catheter. The Henderson catheter is a thin tube that goes into your urethra. It's held in your bladder by a small balloon filled with fluid. The tube drains urine from your bladder into a bag or container. You may have had the catheter for a few days, weeks, or months. You can remove the catheter at home when your doctor says it's okay to remove it. Get ready to remove the catheter  How to get ready to remove a Henderson catheter at home   1. Empty the urine bag. You can empty it into the toilet or a container, as you normally do. 2. Wash your hands. You can also wear disposable gloves if you want to. How to position yourself to remove a Henderson catheter   1. Remove the tape or straps that hold the catheter to your body. It's usually attached to your thigh. 2. Clean your genital area. You can use soap, water, and a clean cloth. 3. Lie down on your back. You can lie flat on your back. It may be more comfortable with your knees bent. Or you can be in a standing position to remove the catheter, if it's comfortable. Use an absorbent pad or towel to catch any extra urine that comes out. How to drain a Henderson catheter's balloon   1. Prepare the syringe. You may need to move the plunger up and down a few times to loosen it. Leave it open about .5 mL.  2. Insert the tip of the syringe into the balloon port on your catheter. Make sure that you know which port is the balloon port. It's not the one where the urine usually comes out. 3. Allow the fluid to drain out. It should drain on its own, without you pulling the plunger back more. You may need to move the syringe lower to get the fluid to drain. 4. If the syringe fills, empty it. You can empty it into a sink or toilet. Reattach the syringe and see if any more fluid drains out. You want to completely empty the balloon. How to pull out a Henderson catheter   1. Gently pull the catheter out of your urethra. Pull it slowly and smoothly. 2. Take controlled breaths. This will help you relax. The catheter should slide out easily. 3. Do not force the catheter out. If it doesn't slide out easily, use the syringe again to try to drain more liquid from the balloon. 4. Clean up. Throw away the catheter, the bag, and the absorbent pad, if you used one. You may also want to clean the area around your genitals again. 5. Wash your hands again. How to care for yourself after you remove a Henderson catheter   1. Hydrate. Drink extra fluids for a little while, unless your doctor tells you not to.  2. Urinate. Urinate as you did before you had the catheter. Your doctor may want to measure the amount of urine to make sure that your bladder is working as it should. 3. Be prepared for some discomfort. It may burn a little bit and you may see a small amount of blood in your urine the first few times you urinate after removal.  4. Try a warm bath. If it's hard for you to urinate, try sitting in a few inches of warm water in the bath (sitz bath). If the urge to urinate comes during the sitz bath, it may be easier for you to urinate while you're still in the bath. When should you call for help? Call your doctor now or seek immediate medical care if:    · The catheter gets stuck or hurts when you remove it.     · The catheter looks like it's broken.     · You have problems urinating after the catheter comes out.     · You can't easily remove the catheter. Watch closely for changes in your health, and be sure to contact your doctor if you have any problems. Where can you learn more?   Go to http://www.gray.com/  Enter F255 in the search box to learn more about \"Learning About Removing a Henderson Catheter at Home. \"  Current as of: June 29, 2020               Content Version: 12.8  © 2006-2021 [employer]. Care instructions adapted under license by [employer] (which disclaims liability or warranty for this information). If you have questions about a medical condition or this instruction, always ask your healthcare professional. [facility] [address] any warranty or liability for your use of this information. Laparoscopically assisted vaginal hysterectomy (LAVH) removes the uterus through the vagina. Your doctor used a lighted tube and surgical tools inserted through small cuts (incisions) in the belly. Then the doctor made a small cut in the vagina. Your uterus was taken out through this cut. You can expect to feel better and stronger each day. But you might need pain medicine for a week or two. You may get tired easily or have less energy than usual. This may last for several weeks after surgery. You will probably notice that your belly is swollen and puffy. This is common. The swelling will take several weeks to go down. It may take about 4 to 6 weeks to fully recover. The recovery time may be shorter for some people. You may have some light vaginal bleeding. Don't have sex until the doctor says it's okay. Don't douche or put anything into your vagina, such as a tampon, until your doctor says it's okay. It's important to avoid lifting while you are recovering so that you can heal.  This care sheet gives you a general idea about how long it will take for you to recover. But each person recovers at a different pace. Follow the steps below to get better as quickly as possible. How can you care for yourself at home? Activity    · Rest when you feel tired. Getting enough sleep will help you recover.     · Try to walk each day. Start by walking a little more than you did the day before. Bit by bit, increase the amount you walk. Walking boosts blood flow and helps prevent pneumonia and constipation.     · Avoid lifting anything that would make you strain. This may include heavy grocery bags and milk containers, a heavy briefcase or backpack, cat litter or dog food bags, a vacuum , or a child.     · Avoid strenuous activities, such as biking, jogging, weight lifting, or aerobic exercise, until your doctor says it is okay.     · You may shower. Pat the cut (incision) dry. Do not take a bath for the first 2 weeks, or until your doctor tells you it is okay.     · Ask your doctor when you can drive again.     · You will probably need to take about 2 weeks off from work. It depends on the type of work you do and how you feel.     · Your doctor will tell you when you can have sex again. Diet    · You can eat your normal diet. If your stomach is upset, try bland, low-fat foods like plain rice, broiled chicken, toast, and yogurt.     · Drink plenty of fluids (unless your doctor tells you not to).     · You may notice that your bowel movements are not regular right after your surgery. This is common. Try to avoid constipation and straining with bowel movements. You may want to take a fiber supplement every day. If you have not had a bowel movement after a couple of days, ask your doctor about taking a mild laxative. Medicines    · Your doctor will tell you if and when you can restart your medicines. He or she will also give you instructions about taking any new medicines.     · If you take aspirin or some other blood thinner, ask your doctor if and when to start taking it again. Make sure that you understand exactly what your doctor wants you to do.     · Be safe with medicines. Take pain medicines exactly as directed. ? If the doctor gave you a prescription medicine for pain, take it as prescribed.   ? If you are not taking a prescription pain medicine, ask your doctor if you can take an over-the-counter medicine.     · If you think your pain medicine is making you sick to your stomach:  ? Take your medicine after meals (unless your doctor has told you not to). ? Ask your doctor for a different pain medicine.     · If your doctor prescribed antibiotics, take them as directed. Do not stop taking them just because you feel better. You need to take the full course of antibiotics. Incision care    · You may have stitches over the cuts (incisions) the doctor made in your belly. If you have strips of tape on the incisions the doctor made, leave the tape on for a week or until it falls off. Or follow your doctor's instructions for removing the tape.     · Wash the area daily with warm, soapy water, and pat it dry. Don't use hydrogen peroxide or alcohol, which can slow healing. You may cover the area with a gauze bandage if it weeps or rubs against clothing. Change the bandage every day.     · Keep the area clean and dry. Other instructions    · You may have some light vaginal bleeding. Wear sanitary pads if needed. Do not douche or use tampons. Follow-up care is a key part of your treatment and safety. Be sure to make and go to all appointments, and call your doctor if you are having problems. It's also a good idea to know your test results and keep a list of the medicines you take. When should you call for help? Call 911 anytime you think you may need emergency care. For example, call if:    · You passed out (lost consciousness).     · You have chest pain, are short of breath, or cough up blood.    Call your doctor now or seek immediate medical care if:    · You have pain that does not get better after you take pain medicine.     · You cannot pass stools or gas.     · You have vaginal discharge that has increased in amount or smells bad.     · You are sick to your stomach or cannot drink fluids.     · You have loose stitches, or your incision comes open.     · Bright red blood has soaked through the bandage over your incision.     · You have signs of infection, such as:  ? Increased pain, swelling, warmth, or redness. ? Red streaks leading from the incision. ? Pus draining from the incision. ? A fever.     · You have bright red vaginal bleeding that soaks one or more pads in an hour, or you have large clots.     · You have signs of a blood clot in your leg (called a deep vein thrombosis), such as:  ? Pain in your calf, back of the knee, thigh, or groin. ? Redness and swelling in your leg. Watch closely for changes in your health, and be sure to contact your doctor if you have any problems. Where can you learn more? Go to http://www.gray.com/  Enter H304 in the search box to learn more about \"Laparoscopically Assisted Vaginal Hysterectomy: What to Expect at Home. \"  Current as of: July 17, 2020               Content Version: 12.8  © 0160-9741 [employer]. Care instructions adapted under license by [employer] (which disclaims liability or warranty for this information). If you have questions about a medical condition or this instruction, always ask your healthcare professional. [place] [address] any warranty or liability for your use of this information.

## 2021-06-01 NOTE — ANESTHESIA PREPROCEDURE EVALUATION
Relevant Problems   No relevant active problems       Anesthetic History     PONV          Review of Systems / Medical History  Patient summary reviewed and pertinent labs reviewed    Pulmonary            Asthma (prn MDI) : well controlled       Neuro/Psych         Headaches     Cardiovascular                  Exercise tolerance: >4 METS     GI/Hepatic/Renal     GERD: well controlled           Endo/Other  Within defined limits           Other Findings   Comments: [place] - Bilateral hearing aids           Physical Exam    Airway  Mallampati: II  TM Distance: 4 - 6 cm  Neck ROM: normal range of motion   Mouth opening: Diminished (comment)     Cardiovascular    Rhythm: regular  Rate: normal         Dental  No notable dental hx       Pulmonary  Breath sounds clear to auscultation               Abdominal         Other Findings            Anesthetic Plan    ASA: 2  Anesthesia type: general            Anesthetic plan and risks discussed with: Patient and Spouse

## 2021-06-01 NOTE — BRIEF OP NOTE
Brief Postoperative Note [id number]    Patient: Wilda Larry  YOB: 1975  MRN: [id number]    Date of Procedure: 2021     Pre-Op Diagnosis: Menorrhagia with regular cycle [N92.0]  Fibroid [D21.9]  Adenomyosis [N80.0]  Female stress incontinence [N39.3]    Post-Op Diagnosis: Same as preoperative diagnosis. Procedure(s): HYSTERECTOMY TOTAL LAPAROSCOPIC WITH BILATERAL SALPINGECTOMY  ALTIS MID URETHRAL SLING; CYSTOSCOPY    Surgeon(s):  DO Loulou Pollard MD Ena Newborn, MD    Surgical Assistant: None    Anesthesia: General     Estimated Blood Loss (mL): 972     Complications: None    Specimens:   ID Type Source Tests Collected by Time Destination   1 : Uterus and bilateral fallopian tubes Fresh Pelvis  Opal Crooks DO 2021 1127 Pathology        Implants:   Implant Name Type Inv.  Item Serial No.  Lot No. LRB No. Used Action   SYSTEM SLNG SGL INCIS W/ INTRO ANCHR TENSIONING SUT ALTIS - [id number]  SYSTEM SLNG SGL INCIS W/ INTRO ANCHR TENSIONING SUT ALTIS  [employer] [id number] N/A 1 Implanted       Drains: * No LDAs found *    Findings: enlarged bulky uterus with powderburn vesicles over right uterosacral ligament normal ovaries normal tubes normal appendix and normal liver edge, no other evidence of e/s    Electronically Signed by Diana Cope DO on 2021 at 12:29 PM

## 2021-06-01 NOTE — ANESTHESIA POSTPROCEDURE EVALUATION
Procedure(s): HYSTERECTOMY TOTAL LAPAROSCOPIC WITH BILATERAL SALPINGECTOMY  ALTIS MID URETHRAL SLING; CYSTOSCOPY. general    Anesthesia Post Evaluation      Multimodal analgesia: multimodal analgesia used between 6 hours prior to anesthesia start to PACU discharge  Patient location during evaluation: PACU  Patient participation: complete - patient participated  Level of consciousness: awake  Pain management: adequate  Airway patency: patent  Anesthetic complications: no  Cardiovascular status: acceptable and hemodynamically stable  Respiratory status: acceptable  Hydration status: acceptable  Comments: Acceptable for discharge from PACU.   Post anesthesia nausea and vomiting:  none  Final Post Anesthesia Temperature Assessment:  Normothermia (36.0-37.5 degrees C)      INITIAL Post-op Vital signs:   Vitals Value Taken Time   /69 06/01/21 1425   Temp 36.3 °C (97.3 °F) 06/01/21 1304   Pulse 77 06/01/21 1425   Resp 18 06/01/21 1425   SpO2 97 % 06/01/21 1425

## 2021-06-01 NOTE — PERIOP NOTES
TRANSFER - OUT REPORT:    Verbal report given to Nasreen Cordova on Maile Loots  being transferred to  for routine post - op       Report consisted of patients Situation, Background, Assessment and   Recommendations(SBAR). Information from the following report(s) SBAR, OR Summary, Intake/Output and MAR was reviewed with the receiving nurse. Lines:   Peripheral IV 06/01/21 Left;Posterior Hand (Active)   Site Assessment Clean, dry, & intact 06/01/21 1305   Phlebitis Assessment 0 06/01/21 1305   Infiltration Assessment 0 06/01/21 1305   Dressing Status Clean, dry, & intact; Occlusive 06/01/21 1305   Dressing Type Transparent;Tape 06/01/21 1305        Opportunity for questions and clarification was provided. Patient transported with:   Tech    VTE prophylaxis orders have been written for Maile Loots. Patient and family given floor number and nurses name. Family updated re: pt status after security code verified.

## 2021-06-01 NOTE — PROGRESS NOTES
Patient assisted OOB to walk around the room with PCT assistance. Tolerates well. Back to bed to eat dinner.

## 2021-06-01 NOTE — OP NOTES
[address] [address]  OPERATIVE REPORT    Name:  Екатерина Sorenson  MR#:  [id number]  :  1975  ACCOUNT #:  [de-identified]  DATE OF SERVICE:  2021    PREOPERATIVE DIAGNOSES:  Menorrhagia, fibroids, adenomyosis, and stress incontinence. POSTOPERATIVE DIAGNOSES:  Menorrhagia, fibroids, adenomyosis, and stress incontinence. PROCEDURE PERFORMED:  Total laparoscopic hysterectomy with bilateral salpingectomy, and Dr. Christiano Hunter performed a sling and cystoscopy. SURGEON:  Joaquim Hashimoto, DO    ASSISTANT:  Brittany Miles MD    ANESTHESIA:  General.    COMPLICATIONS:  None. SPECIMENS REMOVED:  Uterus and bilateral fallopian tubes. IMPLANTS:  none. ESTIMATED BLOOD LOSS:  200 mL. DRAINS:  None. FINDINGS:  Enlarged bulky uterus powder burn vesicles  over the right uterosacral ligament, normal ovaries, normal tubes, normal appendix, and normal liver edge. No other evidence of endometriosis. DETAILS OF PROCEDURE:  After informed consent was obtained regarding risks, benefits, and alternatives to the procedure, the patient was taken to the operating room where she was prepped and draped in the normal sterile fashion. A Henderson catheter was inserted. A single tooth tenaculum was used to grasp the anterior lip of the cervix and the Advincula device was attached to the cervix in the normal fashion with two stay sutures at 3 and 9. Once this was done, attention was then turned to the abdomen. An infraumbilical incision of 5 mm was placed and taken down to the underlying subcutaneous fat. An Optiview port was inserted directly into the intraperitoneal cavity under direct visualization with care to avoid bowel and bladder. Two lateral ports, a 10 on the left and a 5 mm AirSeal on the right were placed under direct visualization. A thorough inspection of the pelvis revealed the above-noted findings.   The tubes were grasped bilaterally, coagulated and transected through the mesosalpinx and the tubes were removed and sent to Pathology separately. The remaining portion of the utero-ovarian pedicle was coagulated and transected with the harmonic scalpel and the remaining portion of the round ligament was coagulated and transected with the harmonic scalpel. The remaining portion of the broad ligament bilaterally was coagulated and transected with the harmonic scalpel until the end of the uterus was reached. Anterior and posterior leaves of the peritoneum were  and the bladder flap was created and the bladder was pushed inferiorly and anteriorly away from the plane of dissection. Once this was done, the Advincula device was pushed up and a circumferential incision was made around the device ring until the uterus was amputated. The uterus was then delivered through the colpotomy incision and sent to Pathology. The cuff was closed with interrupted Vicryls at the angles and the remaining portion, the central portion of the cuff was closed with Stratafix. There was a vessel on the right cuff that was bleeding and could not be deemed hemostatic with the harmonic scalpel, therefore a 5 mm clip was placed over with excellent hemostasis. The pressure was dropped from 15 to 0 multiple times, no further bleeding was noted. Irrigation multiple times revealed no further bleeding. All pedicles were intact and stable without any bleeding. There was no injury to the bowel or bladder. At this point, the ports were removed under direct visualization. Dr. [place] Farah was in the room and the case was turned over to him. All lap, sponge, needle, and instrument counts were correct x2 and the patient was taken to the recovery room in stable condition. Dr Keshia Pretty  assistance was required due to the complexity of case. Assistant performed R side of the hysterectomy and positioned the uterine manipulator and provided counter traction for dissection throughout the procedure.       Lizy Lomax DO      [id number]  D:  06/01/2021 12:41  T:  06/01/2021 17:17  JOB #:  [id number]

## 2021-06-01 NOTE — PROGRESS NOTES
TRANSFER - IN REPORT:  Telephone report received from [address] [place] [address] on Austin Fitting  being received from PACU for routine post - op      Report consisted of patients Situation, Background, Assessment and   Recommendations(SBAR). Information from the following report(s) SBAR was reviewed with the receiving nurse. Opportunity for questions and clarification was provided. Awaiting pts arrival on unit.

## 2021-06-01 NOTE — PROGRESS NOTES
Patient sitting up in bed eating crackers and drinking fluids. Denies nausea. Notes only slight pain. Tylenol given.

## 2021-06-01 NOTE — PROGRESS NOTES
Patient admitted for hysterectomy. Chart reviewed - no needs identified in chart review. SW will continue to follow.     CRISTELA Landeros-GABRIELLA  [facility]   [phone]

## 2021-06-02 NOTE — PROGRESS NOTES
Vag packing removed. Henderson cath teaching complete. Discharge teaching complete. Pt verbalizes understanding. Discharge teaching instructions signed by patient and this RN. Pt transported to a private vehicle via wheelchair. Stable at discharge.

## 2021-06-02 NOTE — OP NOTES
[address] [address]  OPERATIVE REPORT    Name:  Jud Verdin  MR#:  [id number]  :  1975  ACCOUNT #:  [de-identified]  DATE OF SERVICE:  2021    PREOPERATIVE DIAGNOSIS:  Stress urinary incontinence. POSTOPERATIVE DIAGNOSIS:  Stress urinary incontinence. PROCEDURE PERFORMED:  Cystoscopy with Altis mid urethral sling placement. SURGEON:  Keeley Aguilar MD    ASSISTANT:  None. ANESTHESIA:  General.    COMPLICATIONS:  None. SPECIMENS REMOVED:  None. IMPLANTS:  Altis mid urethral sling, log [id number], lot #[id number]. ESTIMATED BLOOD LOSS:  Minimal.    DRAIN: 16-Chilean Henderson catheter with 10 mL of sterile water in balloon. FINDINGS:  Unremarkable Altis mid urethral sling placement. Normal cystoscopy with efflux of clear urine from the bilateral ureters. INDICATIONS FOR OPERATIVE PROCEDURE:  The patient is 77-year-old female with stress urinary incontinence refractory to conservative measures who opted to proceed with a mid urethral sling placement today at the time of a total laparoscopic hysterectomy from menorrhagia. Risk-benefit discussion was had and she opted to proceed. DESCRIPTION OF PROCEDURE:  After informed consent was obtained and the correct patient was identified in the preoperative holding area, she was taken back to the operating room suite and placed on table in the supine position. Time-out was performed confirming the correct patient and planned procedure. She was moved into the dorsal lithotomy position, prepped and draped in the usual sterile fashion. Henderson catheter was placed on the field using sterile technique. Dr. Kinnie Sacks performed the hysterectomy first and then I came in after she was completed for the second part of the procedure. I began the case by draining the bladder completely, placing a weighted vaginal speculum and then performing hydrodistention of the vaginal mucosa using 0.5% Marcaine with epinephrine.   A total of 20 mL were used to distend the anterior vaginal wall and separate it away from the urethra. I then identified a spot 1 cm inferior to the urethral meatus to have the catheter in place. I used a 15-blade scalpel to make a 2-cm incision into the anterior vaginal epithelium. I then used my Metzenbaum scissors to carefully dissect laterally taking care to avoid injury to the urethra and the separate the anterior vaginal wall from the periurethral and bladder tissue. I dissected all the way back to the ischiopubic ramus on either side until I could fit my finger underneath the Ischiopubic ramus on either side. Once the dissection was complete, I then started on the patient's right side and used my right-sided trocar to place the Altis mid urethral sling. I used my finger as a guide to protect the urethra and the bladder and align the trocar parallel to the ischiopubic ramus. I then turned a quarter of a turn once I was in position to place the anchor of the Altis sling on this right side. I then removed the trocar and applied tension to the sling, which confirmed that the anchor was in place with good position. Next, I proceeded to load the sling on to the left-sided anchor. I took care to ensure that the sling was not twisted and in the correct orientation. I used my finger as a guide placing it inside the vaginal incision again inflating the trocar on the left side over my finger under the ischiopubic ramus and then performed a quarter of a turn once the trocar was parallel to the ischiopubic pubic ramus to place the anchored stitch. Once the anchor was in place, I then rotated the trocar back out. This left the sling in position underneath the patient's urethra. The sling did not appear to be twisted. I then deflated the Henderson catheter balloon and removed the Henderson catheter, and inserted a 16-Greek rigid cystoscope to perform a pancystoscopy. The urethra was intact without any evidence of injury.   The bladder was completely normal without any evidence of injury. The ureteral orifices effluxed clear yellow urine and did not demonstrate any evidence of injury. With a normal cystoscopy, I removed my cystoscope and placed a 16-Paraguayan Henderson catheter back in with 10 mL sterile water in the balloon. I then proceeded with my sling tensioning. I placed a right angle between the sling and urethra and carefully held the tensioning suture across the patient's midline to tension the sling against the right-angle device. I ensured that the right angle could pass easily between the sling and the urethra so that not too much tension was applied. Once the current tensioning was achieved and the right angle could easily pass between the sling and urethra, I cut tensioning suture laterally inside the left side of the incision. I then proceeded with my closure using 2-0 Vicryl to close the vaginal incision in a running fashion starting near the urethral meatus and proceeding proximally. Once this was closed the catheter was left to drainage. Vaginal packing with Estrace was applied. The patient was then awoken from anesthesia, transferred to the PACU in stable condition. She tolerated the procedure well. There were no complications. All counts were correct at the end of procedure. The packing will be removed prior to discharge home this afternoon. She will take the catheter out tomorrow morning at home and to notify me if she is unable to void. Otherwise, I will plan to see her in 3-4 weeks for followup.         Britta Tena MD      [id number]  D:  06/01/2021 13:56  T:  06/02/2021 1:07  JOB #:  [id number]

## 2022-03-18 PROBLEM — D25.1 FIBROIDS, INTRAMURAL: Status: ACTIVE | Noted: 2021-06-01

## 2025-08-02 ENCOUNTER — HOSPITAL ENCOUNTER (OUTPATIENT)
Dept: MAMMOGRAPHY | Age: 50
End: 2025-08-02
Payer: COMMERCIAL

## 2025-08-02 DIAGNOSIS — Z12.31 VISIT FOR SCREENING MAMMOGRAM: ICD-10-CM

## 2025-08-02 PROCEDURE — 77063 BREAST TOMOSYNTHESIS BI: CPT

## (undated) DEVICE — SYRINGE IRRIG 60ML SFT PLIABLE BLB EZ TO GRP 1 HND USE W/

## (undated) DEVICE — SHEAR RMFG HARMONIC 5MMX36CM -- LAWSON OEM ITEM 322219

## (undated) DEVICE — SINGLE BASIN: Brand: CARDINAL HEALTH

## (undated) DEVICE — SURGICEL ENDOSCP APPL

## (undated) DEVICE — CYSTO/BLADDER IRRIGATION SET, REGULATING CLAMP

## (undated) DEVICE — AIRSEAL BIFURCATED SMOKE EVAC FILTERED TUBE SET: Brand: AIRSEAL

## (undated) DEVICE — GARMENT,MEDLINE,DVT,INT,CALF,LG, GEN2: Brand: MEDLINE

## (undated) DEVICE — SUTURE STRATAFIX SPRL PDS + SZ 2-0 L6IN ABSRB VLT L36MM SXPP1B409

## (undated) DEVICE — SOLUTION,WATER,IRRIGATION,1000ML,STERILE: Brand: MEDLINE

## (undated) DEVICE — SHEARS ENDOSCP L36CM DIA5MM ULTRASONIC CRV TIP HARM

## (undated) DEVICE — MANIPULATOR UTER 4CM S STL DVC DELINEATOR

## (undated) DEVICE — SOLUTION IRRIG 3000ML 0.9% SOD CHL FLX CONT 0797208] ICU MEDICAL INC]

## (undated) DEVICE — 40585 XL ADVANCED TRENDELENBURG POSITIONING KIT: Brand: 40585 XL ADVANCED TRENDELENBURG POSITIONING KIT

## (undated) DEVICE — SHEARS ENDOSCP HARM 36CM ULTRASONIC CRV TIP UPGRD

## (undated) DEVICE — POWDER HEMOSTAT GEL 3.0GR -- SURGICEL

## (undated) DEVICE — SYR 50ML LR LCK 1ML GRAD NSAF --

## (undated) DEVICE — KIT,ANTI FOG,W/SPONGE & FLUID,SOFT PACK: Brand: MEDLINE

## (undated) DEVICE — REM POLYHESIVE ADULT PATIENT RETURN ELECTRODE: Brand: VALLEYLAB

## (undated) DEVICE — TRI-LUMEN FILTERED TUBE SET WITH ACTIVATED CHARCOAL FILTER: Brand: AIRSEAL

## (undated) DEVICE — 2, DISPOSABLE SUCTION/IRRIGATOR WITHOUT DISPOSABLE TIP: Brand: STRYKEFLOW

## (undated) DEVICE — (D)PREP SKN CHLRAPRP APPL 26ML -- CONVERT TO ITEM 371833

## (undated) DEVICE — DRAPE TWL SURG 16X26IN BLU ORB04] ALLCARE INC]

## (undated) DEVICE — TOTAL 1-LAYER TRAY, LATEX FOLEY, 16FR 10: Brand: MEDLINE

## (undated) DEVICE — SUTURE VCRL SZ 0 L36IN ABSRB UD L36MM CT-1 1/2 CIR J946H

## (undated) DEVICE — SOLUTION IRRIGATION H2O 0797305] ICU MEDICAL INC]

## (undated) DEVICE — SUTURE VCRL SZ 2-0 L36IN ABSRB UD L36MM CT-1 1/2 CIR J945H

## (undated) DEVICE — SUTURE SZ 0 27IN 5/8 CIR UR-6  TAPER PT VIOLET ABSRB VICRYL J603H

## (undated) DEVICE — AIRSEAL 5 MM ACCESS PORT AND LOW PROFILE OBTURATOR WITH BLADELESS OPTICAL TIP, 120 MM LENGTH: Brand: AIRSEAL

## (undated) DEVICE — GYN LAPAROSCOPY: Brand: MEDLINE INDUSTRIES, INC.

## (undated) DEVICE — TROCAR: Brand: KII FIOS FIRST ENTRY

## (undated) DEVICE — CARDINAL HEALTH FLEXIBLE LIGHT HANDLE COVER: Brand: CARDINAL HEALTH

## (undated) DEVICE — TUBING, SUCTION, 1/4" X 10', STRAIGHT: Brand: MEDLINE

## (undated) DEVICE — [HIGH FLOW INSUFFLATOR,  DO NOT USE IF PACKAGE IS DAMAGED,  KEEP DRY,  KEEP AWAY FROM SUNLIGHT,  PROTECT FROM HEAT AND RADIOACTIVE SOURCES.]: Brand: PNEUMOSURE

## (undated) DEVICE — APPLIER CLP M/L SHFT DIA5MM 15 LIG LIGAMAX 5

## (undated) DEVICE — LAPAROSCOPIC TROCAR SLEEVE/SINGLE USE: Brand: KII® OPTICAL ACCESS SYSTEM

## (undated) DEVICE — DRAPE, LAVH, STERILE: Brand: MEDLINE

## (undated) DEVICE — DERMABOND SKIN ADH 0.7ML -- DERMABOND ADVANCED 12/BX

## (undated) DEVICE — PACKING GZ W2INXL6FT WVN COT VAG RADPQ

## (undated) DEVICE — 2000CC GUARDIAN II: Brand: GUARDIAN

## (undated) DEVICE — SOLUTION IV 1000ML 0.9% SOD CHL

## (undated) DEVICE — SYR 10ML LUER LOK 1/5ML GRAD --

## (undated) DEVICE — DRAPE,TOP,102X53,STERILE: Brand: MEDLINE

## (undated) DEVICE — FILTER SMK EVAC FLO CLR MEGADYNE

## (undated) DEVICE — TRAY PREP DRY W/ PREM GLV 2 APPL 6 SPNG 2 UNDPD 1 OVERWRAP

## (undated) DEVICE — TRAY CATH 16F DRN BG LTX -- CONVERT TO ITEM 363158

## (undated) DEVICE — SUTURE MCRYL SZ 4-0 L27IN ABSRB UD L19MM PS-2 1/2 CIR PRIM Y426H